# Patient Record
Sex: FEMALE | Race: WHITE
[De-identification: names, ages, dates, MRNs, and addresses within clinical notes are randomized per-mention and may not be internally consistent; named-entity substitution may affect disease eponyms.]

---
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
## 2020-02-24 NOTE — UC
Respiratory Complaint HPI





- HPI Summary


HPI Summary: 





dx with bronchitis 1 moth ago and has had pneumonia in the past. feel like she 

cannot take a deep breath and chest feels tight---no fevers





- History of Current Complaint


Chief Complaint: UCRespiratory


Stated Complaint: COUGH


Time Seen by Provider: 11/02/19 17:33


Hx Obtained From: Patient


Hx Last Menstrual Period: 10/2/19


Pregnant?: No


Onset/Duration: Sudden Onset, Lasting Days - 3, Still Present


Timing: Constant


Pain Intensity: 0


Pain Scale Used: 0-10 Numeric


Character: Cough: Nonproductive


Aggravating Factors: Deep Breaths, Recumbent Position


Alleviating Factors: Upright Position


Associated Signs And Symptoms: Positive: URI





- Allergies/Home Medications


Allergies/Adverse Reactions: 


 Allergies











Allergy/AdvReac Type Severity Reaction Status Date / Time


 


No Known Allergies Allergy   Verified 11/02/19 16:58











Home Medications: 


 Home Medications





Birth Control 1 tab PO DAILY 11/02/19 [History Confirmed 11/02/19]


Bupropion XL* [Wellbutrin XL *] 150 mg PO DAILY 11/02/19 [History Confirmed 11/ 02/19]


Lisdexamfetamine (NF) [Vyvanse (NF)] 30 mg PO DAILY 11/02/19 [History Confirmed 

11/02/19]











PMH/Surg Hx/FS Hx/Imm Hx


Previously Healthy: No


Psychological History: Anxiety, Other


Other Psychological History: Adhd





- Family History


Known Family History: Positive: None





- Social History


Occupation: Employed Full-time


Lives: Alone


Alcohol Use: None


Substance Use Type: None


Smoking Status (MU): Never Smoked Tobacco





Review of Systems


All Other Systems Reviewed And Are Negative: Yes


Constitutional: Positive: Negative


Skin: Positive: Negative


Eyes: Positive: Negative


ENT: Positive: Negative


Respiratory: Positive: Other - chest and resp. tight ness


Cardiovascular: Positive: Negative


Gastrointestinal: Positive: Negative


Genitourinary: Positive: Negative


Motor: Positive: Negative


Neurovascular: Positive: Negative


Musculoskeletal: Positive: Negative


Neurological: Positive: Negative


Psychological: Positive: Negative


Is Patient Immunocompromised?: No





Physical Exam


Triage Information Reviewed: Yes


Appearance: Well-Appearing, No Pain Distress, Well-Nourished


Vital Signs: 


 Initial Vital Signs











Temp  98.6 F   11/02/19 16:52


 


Pulse  95   11/02/19 16:52


 


Resp  16   11/02/19 16:52


 


BP  119/79   11/02/19 16:52


 


Pulse Ox  100   11/02/19 16:52











Vital Signs Reviewed: Yes


Eye Exam: Normal


Eyes: Positive: Conjunctiva Clear


ENT Exam: Normal


ENT: Positive: Normal ENT inspection, Hearing grossly normal, TMs normal, 

Trismus, Muffled voice, Hoarse voice.  Negative: Pharynx normal, Nasal 

congestion, Tonsillar swelling, Tonsillar exudate, Dental tenderness, Sinus 

tenderness, Uvula midline


Dental Exam: Normal


Neck exam: Normal


Neck: Positive: Supple, Nontender


Respiratory Exam: Normal


Respiratory: Positive: Chest non-tender, Lungs clear, Normal breath sounds, No 

respiratory distress, No accessory muscle use


Cardiovascular Exam: Normal


Cardiovascular: Positive: RRR, No Murmur, Pulses Normal, Brisk Capillary Refill


Abdomen Description: Positive: No Organomegaly, Soft, Bruit


Bowel Sounds: Positive: Present


Musculoskeletal Exam: Normal


Musculoskeletal: Positive: Strength Intact, ROM Intact, No Edema


Neurological Exam: Normal


Neurological: Positive: Alert, Muscle Tone Normal


Psychological Exam: Normal


Psychological: Positive: Normal Response To Family


Skin Exam: Normal





Respiratory Course/Dx





- Course


Course Of Treatment: 


good relief of chest tightness/cough with neb   will rx albuterol and 

aerochamber to use q4 hours prn.. Plan will be to follow at [place] as needed








- Differential Dx/Diagnosis


Provider Diagnosis: 


 Acute bronchospasm due to viral infection








Discharge ED





- Sign-Out/Discharge


Documenting (check all that apply): Patient Departure


All imaging exams completed and their final reports reviewed: No Studies





- Discharge Plan


Condition: Stable


Disposition: HOME


Prescriptions: 


Albuterol HFA INHALER* [Ventolin HFA Inhaler*] 2 puff INH Q4H PRN #1 mdi


 PRN Reason: cough/chest tightness


Patient Education Materials:  Bronchospasm (ED), How to Use a Metered-Dose 

Inhaler and a Spacer (ED)


Referrals: 


[facility] [Outside] - If Needed





- Billing Disposition and Condition


Condition: STABLE


Disposition: Home





- Attestation Statements


Provider Attestation: 





Per institutional requirements, I have reviewed the chart, however, I was not 

consulted specifically or made aware of this patient by the  midlevel provider.

  I did not personally evaluate, interact with , or disposition  this patient. 0 = independent